# Patient Record
Sex: FEMALE | Race: WHITE | Employment: FULL TIME
[De-identification: names, ages, dates, MRNs, and addresses within clinical notes are randomized per-mention and may not be internally consistent; named-entity substitution may affect disease eponyms.]

---

## 2024-07-15 ENCOUNTER — OFFICE VISIT (OUTPATIENT)
Facility: CLINIC | Age: 34
End: 2024-07-15
Payer: COMMERCIAL

## 2024-07-15 VITALS
WEIGHT: 176 LBS | DIASTOLIC BLOOD PRESSURE: 80 MMHG | BODY MASS INDEX: 26.67 KG/M2 | OXYGEN SATURATION: 99 % | SYSTOLIC BLOOD PRESSURE: 110 MMHG | HEART RATE: 60 BPM | RESPIRATION RATE: 16 BRPM | HEIGHT: 68 IN | TEMPERATURE: 98 F

## 2024-07-15 DIAGNOSIS — Z11.59 NEED FOR HEPATITIS C SCREENING TEST: ICD-10-CM

## 2024-07-15 DIAGNOSIS — E66.3 OVERWEIGHT (BMI 25.0-29.9): ICD-10-CM

## 2024-07-15 DIAGNOSIS — Z76.89 ENCOUNTER TO ESTABLISH CARE: Primary | ICD-10-CM

## 2024-07-15 DIAGNOSIS — Z11.4 ENCOUNTER FOR SCREENING FOR HIV: ICD-10-CM

## 2024-07-15 DIAGNOSIS — R73.03 PREDIABETES: ICD-10-CM

## 2024-07-15 DIAGNOSIS — R87.629 ABNORMAL VAGINAL PAP SMEAR: ICD-10-CM

## 2024-07-15 PROBLEM — E53.8 B12 DEFICIENCY: Status: ACTIVE | Noted: 2024-07-15

## 2024-07-15 PROBLEM — F90.0 ATTENTION DEFICIT HYPERACTIVITY DISORDER (ADHD), PREDOMINANTLY INATTENTIVE TYPE: Status: ACTIVE | Noted: 2024-07-15

## 2024-07-15 PROCEDURE — 99204 OFFICE O/P NEW MOD 45 MIN: CPT

## 2024-07-15 RX ORDER — METHYLPHENIDATE HYDROCHLORIDE 54 MG/1
TABLET ORAL
COMMUNITY
Start: 2024-07-09

## 2024-07-15 RX ORDER — SEMAGLUTIDE 1 MG/.5ML
INJECTION, SOLUTION SUBCUTANEOUS
COMMUNITY
Start: 2024-07-09 | End: 2024-07-17 | Stop reason: SDUPTHER

## 2024-07-15 RX ORDER — ESCITALOPRAM OXALATE 20 MG/1
TABLET ORAL
COMMUNITY
Start: 2024-07-04

## 2024-07-15 SDOH — ECONOMIC STABILITY: FOOD INSECURITY: WITHIN THE PAST 12 MONTHS, YOU WORRIED THAT YOUR FOOD WOULD RUN OUT BEFORE YOU GOT MONEY TO BUY MORE.: NEVER TRUE

## 2024-07-15 SDOH — ECONOMIC STABILITY: FOOD INSECURITY: WITHIN THE PAST 12 MONTHS, THE FOOD YOU BOUGHT JUST DIDN'T LAST AND YOU DIDN'T HAVE MONEY TO GET MORE.: NEVER TRUE

## 2024-07-15 SDOH — ECONOMIC STABILITY: INCOME INSECURITY: HOW HARD IS IT FOR YOU TO PAY FOR THE VERY BASICS LIKE FOOD, HOUSING, MEDICAL CARE, AND HEATING?: NOT HARD AT ALL

## 2024-07-15 ASSESSMENT — PATIENT HEALTH QUESTIONNAIRE - PHQ9
SUM OF ALL RESPONSES TO PHQ9 QUESTIONS 1 & 2: 0
1. LITTLE INTEREST OR PLEASURE IN DOING THINGS: NOT AT ALL
SUM OF ALL RESPONSES TO PHQ QUESTIONS 1-9: 0
2. FEELING DOWN, DEPRESSED OR HOPELESS: NOT AT ALL
SUM OF ALL RESPONSES TO PHQ QUESTIONS 1-9: 0

## 2024-07-15 NOTE — PROGRESS NOTES
1. Have you been to the ER, urgent care clinic since your last visit?  Hospitalized since your last visit?No    2. Have you seen or consulted any other health care providers outside of the [facility] since your last visit?  Include any pap smears or colon screening. No     Chief Complaint   Patient presents with    [facility]         PHQ-9 Total Score: 0 (7/15/2024  8:34 AM)    
specialist today.      Follow-up and Dispositions    Return in about 2 days (around 7/17/2024) for VV:Lab Review, OR SOONER IF NEEDED.       I have reviewed with the patient details of the assessment and plan and all questions were answered. Relevent patient education was performed.The most recent lab findings were reviewed with the patient.    An After Visit Summary was printed and given to the patient.      Signed By: MATTHIEU Avina NP     July 15, 2024         -----------------------------------------------------------------------------------------------------------------------------------------      The patient (or guardian, if applicable) and other individuals in attendance with the patient were advised that Artificial Intelligence will be utilized during this visit to record and process the conversation to generate a clinical note. The patient (or guardian, if applicable) and other individuals in attendance at the appointment consented to the use of AI, including the recording.

## 2024-07-15 NOTE — PATIENT INSTRUCTIONS
Will call with lab results and discuss plan accordingly.    Please call your insurance company and see if they cover wegovy in [place].     Please call and make an appointment with the referred specialist today.

## 2024-07-16 LAB
ALBUMIN SERPL-MCNC: 4.4 G/DL (ref 3.9–4.9)
ALP SERPL-CCNC: 47 IU/L (ref 44–121)
ALT SERPL-CCNC: 10 IU/L (ref 0–32)
AST SERPL-CCNC: 17 IU/L (ref 0–40)
BILIRUB SERPL-MCNC: 0.5 MG/DL (ref 0–1.2)
BUN SERPL-MCNC: 12 MG/DL (ref 6–20)
BUN/CREAT SERPL: 14 (ref 9–23)
CALCIUM SERPL-MCNC: 9.4 MG/DL (ref 8.7–10.2)
CHLORIDE SERPL-SCNC: 102 MMOL/L (ref 96–106)
CHOLEST SERPL-MCNC: 161 MG/DL (ref 100–199)
CO2 SERPL-SCNC: 20 MMOL/L (ref 20–29)
CREAT SERPL-MCNC: 0.88 MG/DL (ref 0.57–1)
EGFRCR SERPLBLD CKD-EPI 2021: 89 ML/MIN/1.73
ERYTHROCYTE [DISTWIDTH] IN BLOOD BY AUTOMATED COUNT: 11.8 % (ref 11.7–15.4)
GLOBULIN SER CALC-MCNC: 2.1 G/DL (ref 1.5–4.5)
GLUCOSE SERPL-MCNC: 80 MG/DL (ref 70–99)
HBA1C MFR BLD: 5.1 % (ref 4.8–5.6)
HCT VFR BLD AUTO: 37.7 % (ref 34–46.6)
HCV IGG SERPL QL IA: NON REACTIVE
HDLC SERPL-MCNC: 65 MG/DL
HGB BLD-MCNC: 12.8 G/DL (ref 11.1–15.9)
HIV 1+2 AB+HIV1 P24 AG SERPL QL IA: NON REACTIVE
IMP & REVIEW OF LAB RESULTS: NORMAL
LDLC SERPL CALC-MCNC: 86 MG/DL (ref 0–99)
MCH RBC QN AUTO: 31.5 PG (ref 26.6–33)
MCHC RBC AUTO-ENTMCNC: 34 G/DL (ref 31.5–35.7)
MCV RBC AUTO: 93 FL (ref 79–97)
PLATELET # BLD AUTO: 227 X10E3/UL (ref 150–450)
POTASSIUM SERPL-SCNC: 4.7 MMOL/L (ref 3.5–5.2)
PROT SERPL-MCNC: 6.5 G/DL (ref 6–8.5)
RBC # BLD AUTO: 4.06 X10E6/UL (ref 3.77–5.28)
SODIUM SERPL-SCNC: 139 MMOL/L (ref 134–144)
TRIGL SERPL-MCNC: 49 MG/DL (ref 0–149)
TSH SERPL DL<=0.005 MIU/L-ACNC: 1.43 UIU/ML (ref 0.45–4.5)
VLDLC SERPL CALC-MCNC: 10 MG/DL (ref 5–40)
WBC # BLD AUTO: 5.3 X10E3/UL (ref 3.4–10.8)

## 2024-07-17 ENCOUNTER — TELEMEDICINE (OUTPATIENT)
Facility: CLINIC | Age: 34
End: 2024-07-17
Payer: COMMERCIAL

## 2024-07-17 DIAGNOSIS — Z71.2 ENCOUNTER TO DISCUSS TEST RESULTS: Primary | ICD-10-CM

## 2024-07-17 DIAGNOSIS — E66.3 OVERWEIGHT (BMI 25.0-29.9): ICD-10-CM

## 2024-07-17 DIAGNOSIS — R73.03 PREDIABETES: ICD-10-CM

## 2024-07-17 PROCEDURE — 99213 OFFICE O/P EST LOW 20 MIN: CPT

## 2024-07-17 RX ORDER — SEMAGLUTIDE 1 MG/.5ML
INJECTION, SOLUTION SUBCUTANEOUS
Qty: 2 ML | Refills: 0 | Status: SHIPPED | OUTPATIENT
Start: 2024-07-17

## 2024-07-17 NOTE — PROGRESS NOTES
Chief Complaint   Patient presents with    Discuss Labs     1. Have you been to the ER, urgent care clinic since your last visit?  Hospitalized since your last visit?No    2. Have you seen or consulted any other health care providers outside of the [facility] since your last visit?  Include any pap smears or colon screening. No    
    -----------------------------------------------------------------------------------------------------------------------------------------      The patient (or guardian, if applicable) and other individuals in attendance with the patient were advised that Artificial Intelligence will be utilized during this visit to record and process the conversation to generate a clinical note. The patient (or guardian, if applicable) and other individuals in attendance at the appointment consented to the use of AI, including the recording.

## 2024-07-23 NOTE — PATIENT INSTRUCTIONS
WEIGHT LOSS PLAN    1.  Read food labels and count calories.  Goal 1825-3470 calories daily for women and 2778-5566 calories daily for men as you are weaning off sugar and preparing to start a Low Calorie Dietary Approach.  Achieve this by decreasing caloric intake by 500 calories by weekly increments.  NOTE:  1 pound of fat = 3500 kilocalories!    Www.loseit.SureBooks  Www.Contappspal.SureBooks  Www.Research for Good  Www.Melboss.gov  Weight watchers mobile    Calories needed to lose 1-2 pounds a week = 10 x your weight in pounds    2.  Increase water intake.    Per Biggest Loser Weight loss Program, it is important to drink 1/2 your body weight in ounces of water daily.  Decrease your water consumption 2-3 hours before bedtime to prevent sleep disturbance from frequent urination.    3.  Decrease sugary beverages.  Each can or glass of soda increases your risk of obesity by 60%.  You can lose 10 pounds in a month by avoiding any soda.     12 oz can of soda = 140 calories, 16 oz cup of sweet tea = 200 calories    16 oz orange juice = 200 calories, 10 oz apple juice = 150 calories   32 oz sports drink = 200 calories, 16 oz punch = 240 calories   3.5 oz alcohol = 100-150 calories     4.  Avoid High Fructose Corn Syrup products.  This ingredient makes products highly addictive!    5.  Exercise 30 minutes daily 5 days weekly, minimally.  If you burn 3500 calories that equals a pound!  Try using a pedometer  or a smart watch to your count steps. Visit www.copygram.SureBooks for a free pedometer and diet recommendations.      Your maximum heart rate = 220 - your age    Never exercise at your maximum heart rate.    See handout for target heart rate.    6.  Decrease carbohydrates (white bread, pasta, rice, potatoes, sweet foods and sweet drinks like soda, tea, coffee, juice and sports drinks).  Increase fiber and protein.    Goal:   calories daily of carbohydrates     Try CHROMIUM PICONATE 200 MG THREE TIMES DAILY,    to

## 2024-10-01 DIAGNOSIS — E66.3 OVERWEIGHT (BMI 25.0-29.9): ICD-10-CM

## 2024-10-01 DIAGNOSIS — R73.03 PREDIABETES: ICD-10-CM

## 2024-10-02 RX ORDER — SEMAGLUTIDE 1 MG/.5ML
INJECTION, SOLUTION SUBCUTANEOUS
Qty: 2 ML | Refills: 0 | OUTPATIENT
Start: 2024-10-02

## 2024-12-12 DIAGNOSIS — R73.03 PREDIABETES: ICD-10-CM

## 2024-12-12 DIAGNOSIS — E66.3 OVERWEIGHT (BMI 25.0-29.9): ICD-10-CM

## 2024-12-13 RX ORDER — SEMAGLUTIDE 1 MG/.5ML
INJECTION, SOLUTION SUBCUTANEOUS
Refills: 10 | OUTPATIENT
Start: 2024-12-13

## 2024-12-17 DIAGNOSIS — R73.03 PREDIABETES: ICD-10-CM

## 2024-12-17 DIAGNOSIS — E66.3 OVERWEIGHT (BMI 25.0-29.9): ICD-10-CM

## 2024-12-17 RX ORDER — SEMAGLUTIDE 1 MG/.5ML
INJECTION, SOLUTION SUBCUTANEOUS
Qty: 2 ML | Refills: 0 | OUTPATIENT
Start: 2024-12-17

## 2024-12-17 NOTE — TELEPHONE ENCOUNTER
Pt would like to know if, she can get a refill on the WEGOVY till she comes in on Friday, so she is not skipping doses.

## 2024-12-17 NOTE — TELEPHONE ENCOUNTER
Pt called requesting a refill on Wegovy I let pt know that her last visit was in July and that she will need and appt to see you pt stated that she is suppose to take her next dose this Thursday I did transfer her to the front to make an appt

## 2024-12-18 ENCOUNTER — OFFICE VISIT (OUTPATIENT)
Facility: CLINIC | Age: 34
End: 2024-12-18

## 2024-12-18 VITALS
DIASTOLIC BLOOD PRESSURE: 84 MMHG | HEIGHT: 68 IN | SYSTOLIC BLOOD PRESSURE: 128 MMHG | RESPIRATION RATE: 16 BRPM | OXYGEN SATURATION: 100 % | HEART RATE: 74 BPM | WEIGHT: 170 LBS | BODY MASS INDEX: 25.76 KG/M2

## 2024-12-18 DIAGNOSIS — Z82.49 FAMILY HISTORY OF HEART DISEASE: ICD-10-CM

## 2024-12-18 DIAGNOSIS — Z83.3 FAMILY HISTORY OF DIABETES MELLITUS: ICD-10-CM

## 2024-12-18 DIAGNOSIS — R73.03 PREDIABETES: ICD-10-CM

## 2024-12-18 DIAGNOSIS — R53.82 CHRONIC FATIGUE: ICD-10-CM

## 2024-12-18 DIAGNOSIS — E66.3 OVERWEIGHT (BMI 25.0-29.9): Primary | ICD-10-CM

## 2024-12-18 PROBLEM — F33.9 RECURRENT MAJOR DEPRESSIVE DISORDER (HCC): Status: ACTIVE | Noted: 2024-12-18

## 2024-12-18 PROBLEM — F90.9 ATTENTION DEFICIT HYPERACTIVITY DISORDER: Status: ACTIVE | Noted: 2024-12-18

## 2024-12-18 PROBLEM — F41.9 ANXIETY DISORDER: Status: ACTIVE | Noted: 2024-12-18

## 2024-12-18 LAB
CHOLESTEROL, POC: 136 MG/DL
GLUCOSE, POC: 82 MG/DL
HBA1C MFR BLD: 5.2 %
HDL CHOLESTEROL, POC: 64 MG/DL
LDL CHOLESTEROL, POC: 58 MG/DL
NON-HDL, POC: 73
TCHOL/HDL RATIO, POC: 2.1 MG/DL
TRIGLYCERIDES, POC: 72

## 2024-12-18 RX ORDER — ALPRAZOLAM 0.25 MG/1
0.25 TABLET ORAL DAILY PRN
COMMUNITY
Start: 2024-11-21

## 2024-12-18 RX ORDER — SEMAGLUTIDE 1 MG/.5ML
INJECTION, SOLUTION SUBCUTANEOUS
Qty: 6 ML | Refills: 1 | Status: SHIPPED | OUTPATIENT
Start: 2024-12-18

## 2024-12-18 RX ORDER — SEMAGLUTIDE 1 MG/.5ML
INJECTION, SOLUTION SUBCUTANEOUS
Qty: 2 ML | Refills: 0 | Status: CANCELLED | OUTPATIENT
Start: 2024-12-18

## 2024-12-18 ASSESSMENT — PATIENT HEALTH QUESTIONNAIRE - PHQ9
SUM OF ALL RESPONSES TO PHQ QUESTIONS 1-9: 0
2. FEELING DOWN, DEPRESSED OR HOPELESS: NOT AT ALL
SUM OF ALL RESPONSES TO PHQ9 QUESTIONS 1 & 2: 0
SUM OF ALL RESPONSES TO PHQ QUESTIONS 1-9: 0
SUM OF ALL RESPONSES TO PHQ QUESTIONS 1-9: 0
1. LITTLE INTEREST OR PLEASURE IN DOING THINGS: NOT AT ALL
SUM OF ALL RESPONSES TO PHQ QUESTIONS 1-9: 0

## 2024-12-18 NOTE — PATIENT INSTRUCTIONS
decrease Premenstrual carbohydrate cravings.    7.  Eat 3-5 small meals daily, include lots of protein (beans/legumes, nuts, lean meat, eggs) and green vegetables with each.  (Breakfast, lunch, dinner with 2 healthy snacks)    8.  Get proper rest 7-8 hours uninterrupted.  When you get less than 6 hours, it triggers hunger by affecting your Grehlin:Leptin ratio and this results in weight gain.    9.  Watch your food portions.  Green leafy vegetables should cover 1/2 of the plate, lean meat 1/4 of the plate, starchy vegetable 1/4 of the plate.  Use smaller plates.    10.  Do not eat until you are full.  Eat until you are no longer hungry.  If you are not sure, try drinking a glass of water before getting your second serving of food.    11.  Do not weigh yourself daily.  Wait until your next office visit.  Use how you feel and  how your clothes fit as measurements of success.    12.  Address your spirituality to draw strength from above during your journey.  Remember \"I am fearfully and wonderfully made.  Marvelous in His eyes.\"    13.  Set realistic, appropriate and achievable weight loss goals:     RECOMMENDED TARGET WEIGHT LOSS:  Initial weight loss of 5-10% of your initial body weight achieved over 6 months or a decrease of 2 BMI units.     MINIMUM GOAL OF WEIGHT LOSS:  Reduce body weight and maintain a lower body weight.  Prevent weight gain.    RELATED WEBSITES:      Www.obesityaction.org  Consider joining the Obesity Action Coalition (OAC) for $25/year.  The OAC mission is to elevated and empower those affected by obesity through education, advocacy and support.  Quarterly journals included in membership fee.    Www.[employer].SolFocus  www.LaTherm.com     RELATED DIETS:  Dr. Barry Sánchez Million Pound Weight loss challenge, Mediterranean diet, [place] Diet, Weight Watchers, Paleo Diet (anti-inflammatory diet)

## 2024-12-18 NOTE — PROGRESS NOTES
Avani Rajan is a 34 y.o. female , established patient, here for evaluation of the following chief complaint(s): Weight Management and Medication Refill     Subjective:  History of Present Illness  The patient is a 34-year-old female who presents for weight management and has a history of prediabetes, polyphagia, fatigue and vitamin deficiency.    She reports no new health concerns and is seeking a refill of her medication. She has been monitoring her weight at home, which has remained stable, although she has observed changes in how her clothes fit. She engages in regular physical activity, including home workouts a few days a week, and has been informed by her previous provider about potential muscle mass loss due to her medication. She is actively working to maintain her muscle mass. She is currently on a mid-dose regimen of her medication and is aiming to discontinue it within a year. She is also making efforts to increase her water intake, with a goal of consuming a gallon per day. She has been experiencing dehydration over the past week and a half, which has led to urinary urgency. She is not experiencing palpitations, gastrointestinal issues, chest pain, swelling, irritable bowel syndrome, nausea, or diarrhea. She is not experiencing nocturia. She is concerned about her family history of diabetes, as her A1c levels were elevated even when she was not overweight. She has not consumed food since 7:30 this morning.    She acknowledges the need for dietary improvements, as she has been inconsistent with meal preparation due to her work schedule. Despite being on ADHD medication, which suppresses her appetite, she ensures adequate protein intake through frequent small meals and snacks. She has noticed that her energy levels improve significantly when she consumes high-protein snacks throughout the day, as opposed to waiting until the end of the day.    She is currently on Xanax as needed, prescribed by her

## 2024-12-20 ENCOUNTER — TELEPHONE (OUTPATIENT)
Facility: CLINIC | Age: 34
End: 2024-12-20

## 2024-12-20 NOTE — TELEPHONE ENCOUNTER
Please call pt about an URGENT PA that needs to be completed on her WEGOY, so that it can be shipped overnight today.  Pt would like a call back @ [phone].

## 2024-12-23 ENCOUNTER — TELEPHONE (OUTPATIENT)
Facility: CLINIC | Age: 34
End: 2024-12-23

## 2024-12-23 NOTE — TELEPHONE ENCOUNTER
PT STATED SHE RECEIVED 2 CALLS IN THE LAST 10 MINUTES STATING THE WEGOVY WAS DENIED ([employer]) PT WOULD LIKE TO KNOW IF YOU COULD RECHECK AGAIN WITH INSURANCE?

## 2024-12-23 NOTE — TELEPHONE ENCOUNTER
Pt called the office and spoke with front staff she told the front staff that one of her meds needed a pa done front staff transferred the call I looked in pt chart and the pa was requesting a started on 12/20/24 I also saw that it said that it was close and did not need a pa front staff got a fax and gave it to me and on the fax it said that it was not approve I read the fax to pt and let her know that I wont have to call the insurance company because it was say that it did not need a pa and then the paper said that it was not approved pt then started yelling say that she have bee going back an forth with the pharmacy and her insurance and she doesn't know why this is happening and that our office is not doing our job that we should've had this done and that she has been off her meds for 4 days now I let her know that we didn't know that it needed a pa til she sent the message through [employer] and that it was started on 12/20/24 I let her know again that I would call her insurance and see if I can get it approved she said ok I called the insurance company and answered all there questions that they needed to get it approved I asked if it could be done as urgent she said yes she marked it as urgent she also said that may get an answer tomorrow if not then by Thursday due to the holiday I asked front staff if she would call pt and let her know that the pa was started and may here something tomorrow but if not then by Thursday

## 2025-01-10 DIAGNOSIS — E66.3 OVERWEIGHT (BMI 25.0-29.9): Primary | ICD-10-CM

## 2025-01-10 DIAGNOSIS — R53.82 CHRONIC FATIGUE: ICD-10-CM

## 2025-01-10 DIAGNOSIS — R73.03 PREDIABETES: ICD-10-CM

## 2025-01-10 RX ORDER — TIRZEPATIDE 2.5 MG/.5ML
2.5 INJECTION, SOLUTION SUBCUTANEOUS
Qty: 2 ML | Refills: 0 | Status: SHIPPED | OUTPATIENT
Start: 2025-01-10

## 2025-01-15 DIAGNOSIS — E66.3 OVERWEIGHT (BMI 25.0-29.9): Primary | ICD-10-CM

## 2025-01-15 DIAGNOSIS — R73.03 PREDIABETES: ICD-10-CM

## 2025-01-15 DIAGNOSIS — R53.82 CHRONIC FATIGUE: ICD-10-CM

## 2025-01-15 DIAGNOSIS — E66.3 OVERWEIGHT (BMI 25.0-29.9): ICD-10-CM

## 2025-01-15 DIAGNOSIS — R11.0 NAUSEA: ICD-10-CM

## 2025-01-15 RX ORDER — ONDANSETRON 4 MG/1
4 TABLET, ORALLY DISINTEGRATING ORAL 3 TIMES DAILY PRN
Qty: 21 TABLET | Refills: 0 | Status: SHIPPED | OUTPATIENT
Start: 2025-01-15

## 2025-02-12 DIAGNOSIS — R53.82 CHRONIC FATIGUE: ICD-10-CM

## 2025-02-12 DIAGNOSIS — R73.03 PREDIABETES: ICD-10-CM

## 2025-02-12 DIAGNOSIS — E66.3 OVERWEIGHT (BMI 25.0-29.9): ICD-10-CM

## 2025-02-13 DIAGNOSIS — R73.03 PREDIABETES: ICD-10-CM

## 2025-02-13 DIAGNOSIS — R53.82 CHRONIC FATIGUE: ICD-10-CM

## 2025-02-13 DIAGNOSIS — E66.3 OVERWEIGHT (BMI 25.0-29.9): ICD-10-CM

## 2025-02-20 DIAGNOSIS — E66.3 OVERWEIGHT (BMI 25.0-29.9): ICD-10-CM

## 2025-02-20 DIAGNOSIS — R53.82 CHRONIC FATIGUE: ICD-10-CM

## 2025-02-20 DIAGNOSIS — R73.03 PREDIABETES: ICD-10-CM

## 2025-03-12 ENCOUNTER — HOSPITAL ENCOUNTER (EMERGENCY)
Facility: HOSPITAL | Age: 35
Discharge: HOME | End: 2025-03-12
Attending: EMERGENCY MEDICINE
Payer: COMMERCIAL

## 2025-03-12 VITALS
RESPIRATION RATE: 18 BRPM | HEART RATE: 64 BPM | SYSTOLIC BLOOD PRESSURE: 115 MMHG | BODY MASS INDEX: 25.46 KG/M2 | DIASTOLIC BLOOD PRESSURE: 80 MMHG | TEMPERATURE: 98.6 F | HEIGHT: 68 IN | OXYGEN SATURATION: 97 % | WEIGHT: 168 LBS

## 2025-03-12 DIAGNOSIS — T78.40XA ALLERGIC REACTION, INITIAL ENCOUNTER: Primary | ICD-10-CM

## 2025-03-12 LAB
EKG ATRIAL RATE: 97 BPM
EKG DIAGNOSIS: NORMAL
EKG P AXIS: 14 DEGREES
EKG P-R INTERVAL: 118 MS
EKG Q-T INTERVAL: 368 MS
EKG QRS DURATION: 84 MS
EKG QTC CALCULATION (BAZETT): 467 MS
EKG R AXIS: 32 DEGREES
EKG T AXIS: 7 DEGREES
EKG VENTRICULAR RATE: 97 BPM

## 2025-03-12 PROCEDURE — 2500000003 HC RX 250 WO HCPCS: Performed by: EMERGENCY MEDICINE

## 2025-03-12 PROCEDURE — 2580000003 HC RX 258: Performed by: EMERGENCY MEDICINE

## 2025-03-12 PROCEDURE — 96375 TX/PRO/DX INJ NEW DRUG ADDON: CPT

## 2025-03-12 PROCEDURE — 96374 THER/PROPH/DIAG INJ IV PUSH: CPT

## 2025-03-12 PROCEDURE — 6360000002 HC RX W HCPCS: Performed by: EMERGENCY MEDICINE

## 2025-03-12 PROCEDURE — 93005 ELECTROCARDIOGRAM TRACING: CPT | Performed by: EMERGENCY MEDICINE

## 2025-03-12 PROCEDURE — 99284 EMERGENCY DEPT VISIT MOD MDM: CPT

## 2025-03-12 RX ORDER — DIPHENHYDRAMINE HCL 25 MG
50 CAPSULE ORAL EVERY 6 HOURS PRN
Qty: 30 CAPSULE | Refills: 0 | Status: SHIPPED | OUTPATIENT
Start: 2025-03-12 | End: 2025-03-22

## 2025-03-12 RX ORDER — EPINEPHRINE 0.3 MG/.3ML
0.3 INJECTION SUBCUTANEOUS PRN
Qty: 2 EACH | Refills: 0 | Status: SHIPPED | OUTPATIENT
Start: 2025-03-12

## 2025-03-12 RX ORDER — DIPHENHYDRAMINE HYDROCHLORIDE 50 MG/ML
25 INJECTION INTRAMUSCULAR; INTRAVENOUS
Status: COMPLETED | OUTPATIENT
Start: 2025-03-12 | End: 2025-03-12

## 2025-03-12 RX ORDER — DEXAMETHASONE SODIUM PHOSPHATE 10 MG/ML
10 INJECTION, SOLUTION INTRAMUSCULAR; INTRAVENOUS ONCE
Status: COMPLETED | OUTPATIENT
Start: 2025-03-12 | End: 2025-03-12

## 2025-03-12 RX ADMIN — DIPHENHYDRAMINE HYDROCHLORIDE 25 MG: 50 INJECTION INTRAMUSCULAR; INTRAVENOUS at 16:11

## 2025-03-12 RX ADMIN — SODIUM CHLORIDE, PRESERVATIVE FREE 20 MG: 5 INJECTION INTRAVENOUS at 16:10

## 2025-03-12 RX ADMIN — DEXAMETHASONE SODIUM PHOSPHATE 10 MG: 10 INJECTION, SOLUTION INTRAMUSCULAR; INTRAVENOUS at 16:11

## 2025-03-12 ASSESSMENT — PAIN - FUNCTIONAL ASSESSMENT: PAIN_FUNCTIONAL_ASSESSMENT: 0-10

## 2025-03-12 ASSESSMENT — PAIN SCALES - GENERAL: PAINLEVEL_OUTOF10: 0

## 2025-03-12 NOTE — ED PROVIDER NOTES
[facility] EMERGENCY DEPARTMENT  EMERGENCY DEPARTMENT ENCOUNTER      Pt Name: Avani Rajan  MRN: [id number]  Birthdate 1990  Date of evaluation: 3/12/2025  Provider: Gisella Olsen MD    CHIEF COMPLAINT     No chief complaint on file.        HISTORY OF PRESENT ILLNESS    Avani Rajan is a 36 yo F with h/o ADHD and anxiety who yesterday developed urticaria.  She took cetirizine 10mg around 6am this morning which helped but then this afternoon her hives worsened and she felt tightness in her throat.  She went to [facility] where they administered IM epinephrine around 3pm and called EMS.  She did not receive further medications by EMS.  She states she is feeling better now.            Additional history from independent historians:     Review of External Medical Records:     Nursing Notes were reviewed.    REVIEW OF SYSTEMS       Review of Systems    Except as noted above the remainder of the review of systems was reviewed and negative.       PAST MEDICAL HISTORY     Past Medical History:   Diagnosis Date    ADHD (attention deficit hyperactivity disorder)     Anxiety          SURGICAL HISTORY       Past Surgical History:   Procedure Laterality Date    KNEE SURGERY Left 2010 2013         CURRENT MEDICATIONS       Previous Medications    ALPRAZOLAM (XANAX) 0.25 MG TABLET    Take 1 tablet by mouth daily as needed. Max Daily Amount: 0.25 mg    ESCITALOPRAM (LEXAPRO) 20 MG TABLET        METHYLPHENIDATE (CONCERTA) 54 MG EXTENDED RELEASE TABLET        ONDANSETRON (ZOFRAN-ODT) 4 MG DISINTEGRATING TABLET    Take 1 tablet by mouth 3 times daily as needed for Nausea or Vomiting    TIRZEPATIDE-WEIGHT MANAGEMENT (ZEPBOUND) 5 MG/0.5ML SOAJ SUBCUTANEOUS AUTO-INJECTOR PEN    Inject 5 mg into the skin every 7 days       ALLERGIES     Patient has no known allergies.    FAMILY HISTORY       Family History   Problem Relation Age of Onset    Depression Mother     Bipolar Disorder Mother     Heart Disease

## 2025-03-12 NOTE — ED TRIAGE NOTES
Pt arrives to ED via EMS with reports of an allergic reaction. Pt reports she had hives for the last few days, then this morning she woke up with swelling of her throat and lips. She took zyrtec and was evaluated at [facility] where they gave her 0.3 of epi. On arrival her swelling has subsided. However, she states she still feels like her throat is tight + trouble swallowing + she is beginning to feel itchy again. Pt is not sure of anything specific she took or ate that could've caused this.     Dr. Olsen assessing pt on arrival.

## 2025-03-26 DIAGNOSIS — R73.03 PREDIABETES: ICD-10-CM

## 2025-03-26 DIAGNOSIS — R53.82 CHRONIC FATIGUE: ICD-10-CM

## 2025-03-26 DIAGNOSIS — E66.3 OVERWEIGHT (BMI 25.0-29.9): ICD-10-CM
